# Patient Record
Sex: FEMALE | URBAN - METROPOLITAN AREA
[De-identification: names, ages, dates, MRNs, and addresses within clinical notes are randomized per-mention and may not be internally consistent; named-entity substitution may affect disease eponyms.]

---

## 2020-11-19 ENCOUNTER — TRANSFERRED RECORDS (OUTPATIENT)
Dept: HEALTH INFORMATION MANAGEMENT | Facility: CLINIC | Age: 35
End: 2020-11-19

## 2020-12-14 ENCOUNTER — TRANSFERRED RECORDS (OUTPATIENT)
Dept: HEALTH INFORMATION MANAGEMENT | Facility: CLINIC | Age: 35
End: 2020-12-14

## 2020-12-14 LAB
ALT SERPL-CCNC: 20 U/L (ref 0–55)
AST SERPL-CCNC: 20 U/L (ref 10–40)
INR PPP: 1 (ref 0.9–1.1)
TSH SERPL-ACNC: 1.28 UIU/ML (ref 0.3–4.5)

## 2020-12-24 ENCOUNTER — TRANSFERRED RECORDS (OUTPATIENT)
Dept: HEALTH INFORMATION MANAGEMENT | Facility: CLINIC | Age: 35
End: 2020-12-24

## 2021-03-11 ENCOUNTER — TRANSFERRED RECORDS (OUTPATIENT)
Dept: HEALTH INFORMATION MANAGEMENT | Facility: CLINIC | Age: 36
End: 2021-03-11

## 2021-03-11 LAB
ALT SERPL-CCNC: 16 U/L (ref 0–55)
AST SERPL-CCNC: 19 U/L (ref 10–40)

## 2021-03-22 ENCOUNTER — TRANSFERRED RECORDS (OUTPATIENT)
Dept: HEALTH INFORMATION MANAGEMENT | Facility: CLINIC | Age: 36
End: 2021-03-22

## 2021-03-22 LAB
ALT SERPL-CCNC: 12 U/L (ref 0–55)
AST SERPL-CCNC: 16 U/L (ref 10–40)

## 2021-03-24 ENCOUNTER — MEDICAL CORRESPONDENCE (OUTPATIENT)
Dept: HEALTH INFORMATION MANAGEMENT | Facility: CLINIC | Age: 36
End: 2021-03-24

## 2021-03-25 ENCOUNTER — TRANSFERRED RECORDS (OUTPATIENT)
Dept: HEALTH INFORMATION MANAGEMENT | Facility: CLINIC | Age: 36
End: 2021-03-25

## 2021-03-26 ENCOUNTER — TRANSCRIBE ORDERS (OUTPATIENT)
Dept: OTHER | Age: 36
End: 2021-03-26

## 2021-03-26 DIAGNOSIS — R79.89 ABNORMAL LFTS: ICD-10-CM

## 2021-03-26 DIAGNOSIS — E80.4 GILBERT DISEASE: Primary | ICD-10-CM

## 2021-03-29 NOTE — TELEPHONE ENCOUNTER
RECORDS RECEIVED FROM: [facility]   Appt Date: 4.5.21   NOTES STATUS DETAILS   OFFICE NOTE from referring provider [facility] 3.11.21 Ying Joyner, [facility] telephone encounter   OFFICE NOTES from other specialists N/A    DISCHARGE SUMMARY from hospital N/A    MEDICATION LIST [facility]    LIVER BIOSPY (IF APPLICABLE)      PATHOLOGY REPORTS  N/A    IMAGING     ENDOSCOPY (IF AVAILABLE) N/A    COLONOSCOPY (IF AVAILABLE) N/A    ULTRASOUND LIVER PACS 3.25.21 US abd, [facility]   CT OF ABDOMEN N/A    MRI OF LIVER N/A    FIBROSCAN, US ELASTOGRAPHY, FIBROSIS SCAN, MR ELASTOGRAPHY N/A    LABS     HEPATIC PANEL (LIVER PANEL) N/A    BASIC METABOLIC PANEL N/A    COMPLETE METABOLIC PANEL N/A    COMPLETE BLOOD COUNT (CBC) N/A    INTERNATIONAL NORMALIZED RATIO (INR) N/A    HEPATITIS C ANTIBODY [facility] 2.3.21   HEPATITIS C VIRAL LOAD/PCR N/A    HEPATITIS C GENOTYPE N/A    HEPATITIS B SURFACE ANTIGEN N/A    HEPATITIS B SURFACE ANTIBODY N/A    HEPATITIS B DNA QUANT LEVEL N/A    HEPATITIS B CORE ANTIBODY N/A      Action 3.29.21 MJ   Action Taken  Requested image from PN.  Notes stated Ct images from [employer]. Sent request to see if anything related to liver.     Action 4.2.21 MJ   Action Taken Pulled image from PN into PACS  Nothing from [facility] has been received. 2nd request

## 2021-04-05 ENCOUNTER — PRE VISIT (OUTPATIENT)
Dept: GASTROENTEROLOGY | Facility: CLINIC | Age: 36
End: 2021-04-05

## 2021-04-12 NOTE — TELEPHONE ENCOUNTER
RECORDS RECEIVED FROM:    Appt Date: 04.19.2021   NOTES STATUS DETAILS   OFFICE NOTE from referring provider Received 04.02.2021 Ying Jyoner   OFFICE NOTES from other specialists [facility] 01.15.2021 Wally Villareal MD     DISCHARGE SUMMARY from hospital N/A    MEDICATION LIST [facility] Every[facility]    LIVER BIOSPY (IF APPLICABLE)      PATHOLOGY REPORTS  N/A    IMAGING     ENDOSCOPY (IF AVAILABLE) N/A    COLONOSCOPY (IF AVAILABLE) N/A    ULTRASOUND LIVER [facility]    Received 03.25.2021 US Abd RUQ Organs        11.19.2020 US Abd Pelvis   CT OF ABDOMEN Received 11.19.2020 CT Abd Pelvis   MRI OF LIVER N/A    FIBROSCAN, US ELASTOGRAPHY, FIBROSIS SCAN, MR ELASTOGRAPHY N/A    LABS     HEPATIC PANEL (LIVER PANEL) N/A    BASIC METABOLIC PANEL [facility] 07.07.2019   COMPLETE METABOLIC PANEL [facility] 07.07.2019   COMPLETE BLOOD COUNT (CBC) [facility] 07.07.20109   INTERNATIONAL NORMALIZED RATIO (INR) N/A    HEPATITIS C ANTIBODY [facility] 02.03.2021   HEPATITIS C VIRAL LOAD/PCR N/A    HEPATITIS C GENOTYPE N/A    HEPATITIS B SURFACE ANTIGEN N/A    HEPATITIS B SURFACE ANTIBODY N/A    HEPATITIS B DNA QUANT LEVEL N/A    HEPATITIS B CORE ANTIBODY N/A      Action 04.12.2021 RM   Action Taken Called [facility] to get images pushed over, called [phone] spoke to a rep who will be pushing over image. Pending.       Action 04.15.2021 RM   Action Taken Images received and uploaded to chart.

## 2021-04-19 ENCOUNTER — PRE VISIT (OUTPATIENT)
Dept: GASTROENTEROLOGY | Facility: CLINIC | Age: 36
End: 2021-04-19

## 2021-04-19 ENCOUNTER — VIRTUAL VISIT (OUTPATIENT)
Dept: GASTROENTEROLOGY | Facility: CLINIC | Age: 36
End: 2021-04-19
Attending: INTERNAL MEDICINE

## 2021-04-19 DIAGNOSIS — E80.4 GILBERT DISEASE: ICD-10-CM

## 2021-04-19 DIAGNOSIS — R79.89 ABNORMAL LFTS: ICD-10-CM

## 2021-04-19 PROCEDURE — 99203 OFFICE O/P NEW LOW 30 MIN: CPT | Mod: 95 | Performed by: INTERNAL MEDICINE

## 2021-04-19 RX ORDER — VALACYCLOVIR HYDROCHLORIDE 500 MG/1
500 TABLET, FILM COATED ORAL
COMMUNITY
Start: 2020-03-02

## 2021-04-19 NOTE — PROGRESS NOTES
"Maribel is a 36 year old who is being evaluated via a billable telephone visit.      What phone number would you like to be contacted at? [phone]  How would you like to obtain your AVS? Natalie  Phone call duration: 19 minutes    =========================================================================    [facility]    Hepatology New Patient Visit    Referring provider:  Ying Joyner      36 year old female    Chief complaint: Hyperbilirubinemia     HPI:  She was seen by [facility] and is here for a second opinion.    She was found to have elevated bilirubin sometime in November 2020. She had a CT scan at that time that was also completely normal. At that time, she has a bilirubin of 3.4, but normal liver enzymes. She was sent to see GI because of this. She is unaware of how many times this has happened but thinks that she has \"olive\" colored skin. Her jaundice has improved, and she has no itching.     She notes that for the last few months, she has had a knot in her right abdomen, just underneath her rib cage, but denies any nausea or vomiting, no headaches, no fever or chills, no abdominal distension, no  lower extremity edema, lethargy or confusion.     No history of melena, hematemesis or hematochezia.    Patient denies fevers, sweats, chills or weight loss.    Her weight has been in the 125 pound range and she has no issues with weight gain. She has had irregular menstrual periods and she was recently pregnant, though she had a miscarriage in 01/2021. This was her third miscarriage.     She notes that she was drinking up to two glasses of wine per day and more on the weekends. She has been researching on the Internet and thought she had end-stage liver disease based on her labs. She has since stopped all alcohol use since 11/2020.     Medical hx Surgical hx   Three miscarriages No past surgical history on file.       Medications  Prior to Admission medications  "   Medication Sig Start Date End Date Taking? Authorizing Provider   valACYclovir (VALTREX) 500 MG tablet Take 500 mg by mouth 3/2/20   Reported, Patient       Allergies  No Known Allergies    Family hx Social hx   No family history on file.  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    No alcohol use since 11/2020  Used to drink 1-2 glasses of wine daily and more on the weekends  No smoking   No IVDU        Review of systems  A 10-point review of systems was negative.    Examination  N/A    Laboratory  Hgb 14.6; WBC 6.7,   AST 16, ALT 12, TB 2.0 (DB 0.7), ALP 48  INR 1.0  Albumin 4.4  HCV negative, HBsAg negative  Peripheral smear 3/22/2021: normal    Radiology  Ultrasound 3/25/2021 reviewed- normal liver and gall bladder    Assessment  36 year old female seen for evaluation of hyperbilirubinemia.    Based on her evaluation so far, she has Gilbert syndrome. We discussd that this is a common disorder of bilirubin metabolism, that causes intermittent episodes of jaundice. Patients with Gilbert syndrome are asymptomatic and have normal physical examination findings. Her bilirubin will rise with physiologic stress, illness, or fasting, but this is not dangerous and does not put her at risk for liver cirrhosis or liver cancer.  No additional testing is needed and no specific therapy is required.    We discussed her alcohol use, and we agree that this is excessive. She has quit alcohol use ad is not planning to drink any alcohol again.     Plan  - No additional testing required  - Avoid alcohol use    Patient discussed with attending physician, Dr. Jet Jaramillo MD  Transplant Hepatology fellow  PGY 6  [facility]